# Patient Record
Sex: FEMALE | Race: WHITE
[De-identification: names, ages, dates, MRNs, and addresses within clinical notes are randomized per-mention and may not be internally consistent; named-entity substitution may affect disease eponyms.]

---

## 2019-04-29 ENCOUNTER — HOSPITAL ENCOUNTER (EMERGENCY)
Dept: HOSPITAL 89 - ER | Age: 24
Discharge: HOME | End: 2019-04-29
Payer: COMMERCIAL

## 2019-04-29 VITALS — SYSTOLIC BLOOD PRESSURE: 138 MMHG | DIASTOLIC BLOOD PRESSURE: 85 MMHG

## 2019-04-29 DIAGNOSIS — S81.812A: Primary | ICD-10-CM

## 2019-04-29 PROCEDURE — 99283 EMERGENCY DEPT VISIT LOW MDM: CPT

## 2019-04-29 PROCEDURE — 90715 TDAP VACCINE 7 YRS/> IM: CPT

## 2019-04-29 PROCEDURE — 90471 IMMUNIZATION ADMIN: CPT

## 2019-04-29 NOTE — ER REPORT
History and Physical


Time Seen By MD:  21:20


HPI/ROS


CHIEF COMPLAINT: Shin laceration





HISTORY OF PRESENT ILLNESS: This is a 23-year-old female. Fell against a shoe 


rack tonight, large laceration in the left anterior shin. Denies any numbness. 


Denies any weakness. Unsure when her last tetanus shot was. Very little bleeding


but large open wound.


Allergies:  


Coded Allergies:  


     No Known Drug Allergies (Unverified , 4/29/19)


Home Meds


Active Scripts


Amoxicillin/Pot Clav 875-125 Mg Tab (AUGMENTIN 875-125 TABLET) 1 Each Tablet, 1 


TAB PO Q12H, #14 TAB 0 Refills


   Prov:ARELY PRECIADO MD         4/29/19


Reviewed Nurses Notes:  Yes


Constitutional





Vital Sign - Last 24 Hours








 4/29/19 4/29/19





 21:16 23:09


 


Temp 98.3 


 


Pulse 69 68


 


Resp 12 12


 


B/P (MAP) 132/100 138/85 (102)


 


Pulse Ox 92 96


 


O2 Delivery Room Air Room Air








Physical Exam


Gen.: Alert, no acute distress.


Skin: Approximate 5 cm flap laceration goes deep into the subcutaneous tissue, 


does not appear to have any tendon or muscle involvement.


Musculoskeletal: No muscle or tendon involvement noted. Normal strength with 


flexion and extension of the ankle.


Neuro: Normal sensation.


Cardiovascular: Normal peripheral perfusion.





Medical Decision Making


ED Course/Re-evaluation


ED Course





Procedure: Laceration Repair





Verbal consent from patient after discussing repair options, risks and benefits.


Wound cleaned extensively with Hibiclens and saline.


Anesthesia: Local use of 1% lidocaine with epinephrine and 0.5% bupivacaine.


Location: Anterior left shin. 


Length: 5 cm, flap.


There were no deep structures involved. 


No tendon injury was identified. 


Wound repair: 3 interrupted 4-0 Vicryl sutures followed by X or closure using 4-


0 Prolene, 5 mattress sutures with 5 additional interrupted sutures.  


The wound repair was intermediate and performed by myself.





Wound care instructions discussed.


Sutures need to be removed in 7 days.


Tetanus booster given.


Augmentin 875/125 twice a day for 7 days.


Decision to Disposition Date:  Apr 29, 2019


Decision to Disposition Time:  22:30





Depart


Departure


Latest Vital Signs





Vital Signs








  Date Time  Temp Pulse Resp B/P (MAP) Pulse Ox O2 Delivery O2 Flow Rate FiO2


 


4/29/19 23:09  68 12 138/85 (102) 96 Room Air  


 


4/29/19 21:16 98.3       








Impression:  


   Primary Impression:  


   Laceration of lower leg


Condition:  Improved


Disposition:  HOME OR SELF-CARE


New Scripts


Amoxicillin/Pot Clav 875-125 Mg Tab (AUGMENTIN 875-125 TABLET) 1 Each Tablet


1 TAB PO Q12H, #14 TAB 0 Refills


   Prov: ARELY PRECIADO MD         4/29/19


Patient Instructions:  Laceration (ED)





Additional Instructions:  


Wound Care: Wash the wound once a day with soap and water. Dry the wound and 


apply a small amount of antibiotic ointment with a clean dressing. If the 


dressing becomes wet or dirty, repeat cleaning and dressing as above. No soaking


the wound; no swimming.


Stitches need to be removed in 7 days.


Pain Control: Use Tylenol or ibuprofen for pain. Using and ice pack can help red


uce swelling.


Antibiotic: Augmentin 875/125 twice a day for 7 days.





Problem Qualifiers








   Primary Impression:  


   Laceration of lower leg


   Encounter type:  initial encounter  Laterality:  left  Qualified Codes:  


   S81.812A - Laceration without foreign body, left lower leg, initial encounter








ARELY PRECIADO MD             Apr 29, 2019 21:20